# Patient Record
Sex: MALE | Race: WHITE | HISPANIC OR LATINO | Employment: FULL TIME | URBAN - METROPOLITAN AREA
[De-identification: names, ages, dates, MRNs, and addresses within clinical notes are randomized per-mention and may not be internally consistent; named-entity substitution may affect disease eponyms.]

---

## 2021-07-10 ENCOUNTER — OFFICE VISIT (OUTPATIENT)
Dept: URGENT CARE | Facility: CLINIC | Age: 41
End: 2021-07-10
Payer: COMMERCIAL

## 2021-07-10 VITALS
TEMPERATURE: 97.5 F | DIASTOLIC BLOOD PRESSURE: 64 MMHG | HEART RATE: 74 BPM | WEIGHT: 209.6 LBS | OXYGEN SATURATION: 95 % | SYSTOLIC BLOOD PRESSURE: 136 MMHG | RESPIRATION RATE: 20 BRPM | BODY MASS INDEX: 30.01 KG/M2 | HEIGHT: 70 IN

## 2021-07-10 DIAGNOSIS — H11.31 SUBCONJUNCTIVAL HEMORRHAGE OF RIGHT EYE: ICD-10-CM

## 2021-07-10 DIAGNOSIS — T15.91XA FOREIGN BODY OF RIGHT EYE, INITIAL ENCOUNTER: ICD-10-CM

## 2021-07-10 PROCEDURE — 99204 OFFICE O/P NEW MOD 45 MIN: CPT | Performed by: NURSE PRACTITIONER

## 2021-07-10 RX ORDER — ERYTHROMYCIN 5 MG/G
1 OINTMENT OPHTHALMIC
Qty: 3.5 G | Refills: 0 | Status: SHIPPED | OUTPATIENT
Start: 2021-07-10

## 2021-07-10 ASSESSMENT — ENCOUNTER SYMPTOMS
FEVER: 0
EYE DISCHARGE: 0
BLURRED VISION: 0
EYE REDNESS: 1
FOREIGN BODY SENSATION: 1
DOUBLE VISION: 0
EYE PAIN: 1
PHOTOPHOBIA: 1
EYE ITCHING: 0

## 2021-07-10 NOTE — PROGRESS NOTES
Subjective:     Nick Finnegan is a 40 y.o. male who presents for Other (pt feels he has something in his (R) eye sense yesterday )      Felt something go in right eye. Has been on a Zoom meeting at the time. Felt as if there was an eyelash in his eye, and could later visualize an eyelash. Tried to irrigate the eye, and sweep it out with q-tip without sucess.     Eye Problem   The right eye is affected. This is a new problem. The current episode started yesterday. The problem occurs constantly. The pain is at a severity of 2/10. The pain is mild. He does not wear contacts. Associated symptoms include eye redness, a foreign body sensation and photophobia. Pertinent negatives include no blurred vision, eye discharge, double vision, fever or itching. The treatment provided no relief.       History reviewed. No pertinent past medical history.    History reviewed. No pertinent surgical history.    Social History     Socioeconomic History   • Marital status:      Spouse name: Not on file   • Number of children: Not on file   • Years of education: Not on file   • Highest education level: Not on file   Occupational History   • Not on file   Tobacco Use   • Smoking status: Never Smoker   • Smokeless tobacco: Never Used   Vaping Use   • Vaping Use: Never used   Substance and Sexual Activity   • Alcohol use: Never   • Drug use: Never   • Sexual activity: Not on file   Other Topics Concern   • Not on file   Social History Narrative   • Not on file     Social Determinants of Health     Financial Resource Strain:    • Difficulty of Paying Living Expenses:    Food Insecurity:    • Worried About Running Out of Food in the Last Year:    • Ran Out of Food in the Last Year:    Transportation Needs:    • Lack of Transportation (Medical):    • Lack of Transportation (Non-Medical):    Physical Activity:    • Days of Exercise per Week:    • Minutes of Exercise per Session:    Stress:    • Feeling of Stress :    Social Connections:     "  • Frequency of Communication with Friends and Family:    • Frequency of Social Gatherings with Friends and Family:    • Attends Voodoo Services:    • Active Member of Clubs or Organizations:    • Attends Club or Organization Meetings:    • Marital Status:    Intimate Partner Violence:    • Fear of Current or Ex-Partner:    • Emotionally Abused:    • Physically Abused:    • Sexually Abused:         History reviewed. No pertinent family history.     No Known Allergies    Review of Systems   Constitutional: Negative for fever.   Eyes: Positive for photophobia, pain and redness. Negative for blurred vision, double vision, discharge and itching.   All other systems reviewed and are negative.       Objective:   /64 (BP Location: Left arm, Patient Position: Sitting, BP Cuff Size: Adult)   Pulse 74   Temp 36.4 °C (97.5 °F) (Temporal)   Resp 20   Ht 1.778 m (5' 10\")   Wt 95.1 kg (209 lb 9.6 oz)   SpO2 95%   BMI 30.07 kg/m²     Physical Exam  Eyes:      General: Lids are normal.         Right eye: Foreign body present. No discharge.      Extraocular Movements: Extraocular movements intact.      Conjunctiva/sclera:      Right eye: Right conjunctiva is injected. No chemosis or exudate.     Left eye: Left conjunctiva is not injected.      Pupils: Pupils are equal, round, and reactive to light.      Right eye: No corneal abrasion or fluorescein uptake.        Comments: Injected medial eye. Visible foreign body, appears to be an embedded eyelash.     Fluorescine stain shows enhanced shadowing around area of foreign body, no ulceration, or uptake.      Pulmonary:      Effort: Pulmonary effort is normal.   Skin:     General: Skin is warm and dry.      Findings: No rash.   Neurological:      General: No focal deficit present.      Mental Status: He is alert and oriented to person, place, and time.   Psychiatric:         Mood and Affect: Mood normal.         Behavior: Behavior normal.         Assessment/Plan:   1. " Foreign body of right eye, initial encounter  - erythromycin 5 MG/GM Ointment; Apply 1 Application to both eyes at bedtime.  Dispense: 3.5 g; Refill: 0  - REFERRAL TO OPHTHALMOLOGY  -Visual Acuity  -Woods Lamp exam.    2. Subconjunctival hemorrhage of right eye  - REFERRAL TO OPHTHALMOLOGY    Procedure: Attempted removal of foreign body in right eye.  -Irrigated eye with NS, unable to flush out foreign body as it is embedded in conjunctiva.  -Applied proparacaine drops.  -Attempt to sweep out foreign body with cotton tip applicator without success.  -[place]amp exam performed.  -Tangential approach using a 25 g needle used to attempt to dislodge foreign body. Attempted to x1, without success, resulting in a subconjunctival hemorrrhage.  -Patient tolerate procedure well.    Discussed initiating antibiotic coverage with a referral to ophthalmology for removal of the foreign body. Called and consulted with Dr. Stephenie Lind with [facility]. Advised that they would reach out to patient directly to schedule a visit for further evaluation. Patient provided contact information.     Differential diagnosis, natural history, supportive care, and indications for immediate follow-up discussed.

## 2021-07-10 NOTE — PATIENT INSTRUCTIONS
"Corneal Foreign Body  A corneal foreign body is an injury from material in your eye. This foreign body became stuck in (lodged) in the clear layer that covers the front part of the eye. Specks of metal, sand or wood commonly cause this injury. Using a local anesthetic, your caregiver removed the foreign body in your cornea. This local anesthetic is a medication that makes the cornea numb. Your eye will be painful when the local anesthetic wears off. Blinking the eye increases pain, so sometimes a patch is applied to eliminate this. The more you rest your \"good eye\", the better both eyes will feel.  HOME CARE INSTRUCTIONS   · The use of eye patches is not universal and their use varies from state to state and from caregiver to caregiver. If eye patch was applied:  · Keep your eye patch on for as long as directed by your caregiver until your follow-up appointment.  · Do NOT remove the patch unless instructed to do so to put in medications; then replace patch and re-tape it as it was before. Follow the same procedure if the patch becomes loose.  · WARNING: Do not drive or operate machinery while your eye is patched. Your ability to  distances is impaired.  · If no eye patch was applied:  · Keep your eye closed as much as possible. Do not rub your eye.  · Wear dark glasses for as long as directed by your caregiver to protect your eyes from bright light.  · Do not wear contact lenses for as long as directed by your caregiver.  · Wear protective eye covering if your job or hobby involves the risk of eye injury. This is especially important when working with high speed tools.  · Only take over-the-counter or prescription medicines for pain, discomfort, or fever as directed by your caregiver.  SEEK IMMEDIATE MEDICAL CARE IF:   · Pain increases in your eye or your vision changes.  · You have problems with your eye patch.  · The injury to your eye appears to be getting larger.  · You develop any kind of discharge from " the injured eye.  · Swelling and/or soreness (inflammation) develops around the affected eye.  · An oral temperature above 102° F (38.9° C) develops.  MAKE SURE YOU:   · Understand these instructions.  · Will watch your condition.  · Will get help right away if you are not doing well or get worse.  Document Released: 12/15/2001 Document Revised: 03/11/2013 Document Reviewed: 08/05/2009  [employer]® Patient Information ©2014 [employer].     Subconjunctival Hemorrhage  Subconjunctival hemorrhage is bleeding that happens between the white part of your eye (sclera) and the clear membrane that covers the outside of your eye (conjunctiva). There are many tiny blood vessels near the surface of your eye. A subconjunctival hemorrhage happens when one or more of these vessels breaks and bleeds, causing a red patch to appear on your eye. This is similar to a bruise.  Depending on the amount of bleeding, the red patch may only cover a small area of your eye or it may cover the entire visible part of the sclera. If a lot of blood collects under the conjunctiva, there may also be swelling. Subconjunctival hemorrhages do not affect your vision or cause pain, but your eye may feel irritated if there is swelling. Subconjunctival hemorrhages usually do not require treatment, and they usually disappear on their own within two weeks.  What are the causes?  This condition may be caused by:  · Mild trauma, such as rubbing your eye too hard.  · Blunt injuries, such as from playing sports or having contact with a deployed airbag.  · Coughing, sneezing, or vomiting.  · Straining, such as when lifting a heavy object.  · High blood pressure.  · Recent eye surgery.  · Diabetes.  · Certain medicines, especially blood thinners (anticoagulants).  · Other conditions, such as eye tumors, bleeding disorders, or blood vessel abnormalities.  Subconjunctival hemorrhages can also happen without an obvious cause.  What are the signs or  symptoms?  Symptoms of this condition include:  · A bright red or dark red patch on the white part of the eye. The red area may:  ? Spread out to cover a larger area of the eye before it goes away.  ? Turn brownish-yellow before it goes away.  · Swelling around the eye.  · Mild eye irritation.  How is this diagnosed?  This condition is diagnosed with a physical exam. If your subconjunctival hemorrhage was caused by trauma, your health care provider may refer you to an eye specialist (ophthalmologist) or another specialist to check for other injuries. You may have other tests, including:  · An eye exam.  · A blood pressure check.  · Blood tests to check for bleeding disorders.  If your subconjunctival hemorrhage was caused by trauma, X-rays or a CT scan may be done to check for other injuries.  How is this treated?  Usually, treatment is not needed for this condition. If you have discomfort, your health care provider may recommend eye drops or cold compresses.  Follow these instructions at home:  · Take over-the-counter and prescription medicines only as directed by your health care provider.  · Use eye drops or cold compresses to help with discomfort as directed by your health care provider.  · Avoid activities, things, and environments that may irritate or injure your eye.  · Keep all follow-up visits as told by your health care provider. This is important.  Contact a health care provider if:  · You have pain in your eye.  · The bleeding does not go away within 3 weeks.  · You keep getting new subconjunctival hemorrhages.  Get help right away if:  · Your vision changes or you have difficulty seeing.  · You suddenly develop severe sensitivity to light.  · You develop a severe headache, persistent vomiting, confusion, or abnormal tiredness (lethargy).  · Your eye seems to bulge or protrude from your eye socket.  · You develop unexplained bruises on your body.  · You have unexplained bleeding in another area of your  body.  Summary  · Subconjunctival hemorrhage is bleeding that happens between the white part of your eye and the clear membrane that covers the outside of your eye.  · This condition is similar to a bruise.  · Subconjunctival hemorrhages usually do not require treatment, and they usually disappear on their own within two weeks.  · Use eye drops or cold compresses to help with discomfort as directed by your health care provider.  This information is not intended to replace advice given to you by your health care provider. Make sure you discuss any questions you have with your health care provider.  Document Released: 12/18/2006 Document Revised: 09/18/2019 Document Reviewed: 09/18/2019  [employer] Patient Education © 2020 [employer]